# Patient Record
Sex: MALE | Race: WHITE | NOT HISPANIC OR LATINO | URBAN - METROPOLITAN AREA
[De-identification: names, ages, dates, MRNs, and addresses within clinical notes are randomized per-mention and may not be internally consistent; named-entity substitution may affect disease eponyms.]

---

## 2022-07-09 ENCOUNTER — TELEPHONE ENCOUNTER (OUTPATIENT)
Dept: URBAN - METROPOLITAN AREA CLINIC 121 | Facility: CLINIC | Age: 67
End: 2022-07-09

## 2022-07-09 RX ORDER — ZOLPIDEM TARTRATE 10 MG/1
TABLET, FILM COATED ORAL
Refills: 0 | OUTPATIENT
Start: 2016-01-05 | End: 2016-02-04

## 2022-07-09 RX ORDER — LACTOBACILLUS COMBINATION NO.4 3B CELL
CAPSULE ORAL ONCE A DAY
Refills: 0 | OUTPATIENT
Start: 2016-01-05 | End: 2016-02-04

## 2022-07-09 RX ORDER — ESOMEPRAZOLE MAGNESIUM 40 MG/1
GRANULE, DELAYED RELEASE ORAL ONCE A DAY
Refills: 0 | OUTPATIENT
Start: 2016-01-05 | End: 2016-02-04

## 2022-07-09 RX ORDER — TIOTROPIUM BROMIDE INHALATION SPRAY 3.12 UG/1
SPRAY, METERED RESPIRATORY (INHALATION)
Refills: 0 | OUTPATIENT
Start: 2016-01-05 | End: 2016-02-04

## 2022-07-09 RX ORDER — ZOLPIDEM TARTRATE 10 MG/1
TABLET, FILM COATED ORAL
Refills: 0 | OUTPATIENT
Start: 2016-02-04 | End: 2019-01-14

## 2022-07-09 RX ORDER — ESOMEPRAZOLE MAGNESIUM 40 MG/1
GRANULE, DELAYED RELEASE ORAL
Refills: 0 | OUTPATIENT
Start: 2014-10-13 | End: 2016-01-05

## 2022-07-09 RX ORDER — ALBUTEROL SULFATE 90 UG/1
AEROSOL, METERED RESPIRATORY (INHALATION)
Refills: 0 | OUTPATIENT
Start: 2016-01-05 | End: 2016-02-04

## 2022-07-09 RX ORDER — LOVASTATIN 40 MG/1
TABLET ORAL ONCE A DAY
Refills: 0 | OUTPATIENT
Start: 2016-01-05 | End: 2016-02-04

## 2022-07-09 RX ORDER — SENNOSIDES 8.6 MG
TABLET ORAL
Refills: 0 | OUTPATIENT
Start: 2014-11-20 | End: 2016-01-05

## 2022-07-09 RX ORDER — LACTOBACILLUS COMBINATION NO.4 3B CELL
CAPSULE ORAL
Refills: 0 | OUTPATIENT
Start: 2014-11-20 | End: 2016-01-05

## 2022-07-09 RX ORDER — LACTOBACILLUS COMBINATION NO.4 3B CELL
CAPSULE ORAL ONCE A DAY
Refills: 0 | OUTPATIENT
Start: 2016-02-04 | End: 2019-01-14

## 2022-07-09 RX ORDER — OXYCODONE AND ACETAMINOPHEN 5; 325 MG/1; MG/1
TABLET ORAL
Refills: 0 | OUTPATIENT
Start: 2014-10-13 | End: 2014-11-20

## 2022-07-09 RX ORDER — ESOMEPRAZOLE MAGNESIUM 40 MG/1
GRANULE, DELAYED RELEASE ORAL ONCE A DAY
Refills: 0 | OUTPATIENT
Start: 2016-02-04 | End: 2018-11-21

## 2022-07-10 ENCOUNTER — TELEPHONE ENCOUNTER (OUTPATIENT)
Dept: URBAN - METROPOLITAN AREA CLINIC 121 | Facility: CLINIC | Age: 67
End: 2022-07-10

## 2022-07-10 RX ORDER — LOVASTATIN 40 MG/1
TABLET ORAL ONCE A DAY
Refills: 0 | Status: ACTIVE | COMMUNITY
Start: 2016-02-04

## 2022-07-10 RX ORDER — FENOFIBRATE 160 MG/1
TABLET ORAL ONCE A DAY
Refills: 0 | Status: ACTIVE | COMMUNITY
Start: 2018-12-16

## 2022-07-10 RX ORDER — ATORVASTATIN CALCIUM 40 MG/1
TABLET, FILM COATED ORAL ONCE A DAY
Refills: 0 | Status: ACTIVE | COMMUNITY
Start: 2018-10-03

## 2022-07-10 RX ORDER — TAMSULOSIN HYDROCHLORIDE 0.4 MG/1
CAPSULE ORAL ONCE A DAY
Refills: 0 | Status: ACTIVE | COMMUNITY
Start: 2018-10-20

## 2022-07-10 RX ORDER — ESOMEPRAZOLE SODIUM 40 MG/5ML
INJECTION, POWDER, LYOPHILIZED, FOR SOLUTION INTRAVENOUS ONCE A DAY
Refills: 0 | Status: ACTIVE | COMMUNITY
Start: 2018-11-11

## 2022-07-10 RX ORDER — DICYCLOMINE HYDROCHLORIDE 10 MG/1
THREE TIMES A DAY CAPSULE ORAL THREE TIMES A DAY
Refills: 1 | Status: ACTIVE | COMMUNITY
Start: 2019-01-14

## 2022-07-10 RX ORDER — ZOLPIDEM TARTRATE 10 MG/1
TABLET, FILM COATED ORAL ONCE A DAY
Refills: 0 | Status: ACTIVE | COMMUNITY
Start: 2018-10-22

## 2022-07-10 RX ORDER — TIOTROPIUM BROMIDE INHALATION SPRAY 3.12 UG/1
SPRAY, METERED RESPIRATORY (INHALATION)
Refills: 0 | Status: ACTIVE | COMMUNITY
Start: 2016-02-04

## 2022-07-10 RX ORDER — ASPIRIN 81 MG/1
TABLET, FILM COATED ORAL ONCE A DAY
Refills: 0 | Status: ACTIVE | COMMUNITY
Start: 2019-01-14

## 2022-07-10 RX ORDER — TIOTROPIUM BROMIDE INHALATION SPRAY 3.12 UG/1
SPRAY, METERED RESPIRATORY (INHALATION)
Refills: 0 | Status: ACTIVE | COMMUNITY
Start: 2018-11-11

## 2022-07-10 RX ORDER — ALBUTEROL SULFATE 90 UG/1
AEROSOL, METERED RESPIRATORY (INHALATION)
Refills: 0 | Status: ACTIVE | COMMUNITY
Start: 2016-02-04

## 2022-07-30 ENCOUNTER — TELEPHONE ENCOUNTER (OUTPATIENT)
Age: 67
End: 2022-07-30

## 2022-07-30 RX ORDER — ESOMEPRAZOLE MAGNESIUM 40 MG/1
1 CAPSULE, DELAYED RELEASE ORAL DAILY
Qty: 0 | Refills: 2 | OUTPATIENT
Start: 2020-01-13 | End: 2020-01-13

## 2022-07-31 ENCOUNTER — TELEPHONE ENCOUNTER (OUTPATIENT)
Age: 67
End: 2022-07-31

## 2022-07-31 RX ORDER — WHEAT DEXTRIN 3 G/4 G
1 POWDER (GRAM) ORAL
Qty: 0 | Refills: 2 | Status: ACTIVE | COMMUNITY
Start: 2020-01-13

## 2022-07-31 RX ORDER — FAMOTIDINE 20 MG/1
1 TABLET ORAL
Qty: 0 | Refills: 16 | Status: ACTIVE | COMMUNITY
Start: 2020-01-13

## 2022-11-10 ENCOUNTER — WEB ENCOUNTER (OUTPATIENT)
Dept: URBAN - METROPOLITAN AREA CLINIC 9 | Facility: CLINIC | Age: 67
End: 2022-11-10

## 2022-11-10 ENCOUNTER — OFFICE VISIT (OUTPATIENT)
Dept: URBAN - METROPOLITAN AREA CLINIC 9 | Facility: CLINIC | Age: 67
End: 2022-11-10
Payer: COMMERCIAL

## 2022-11-10 VITALS
DIASTOLIC BLOOD PRESSURE: 70 MMHG | HEIGHT: 72 IN | BODY MASS INDEX: 22.89 KG/M2 | WEIGHT: 169 LBS | SYSTOLIC BLOOD PRESSURE: 118 MMHG

## 2022-11-10 DIAGNOSIS — K21.9 GASTROESOPHAGEAL REFLUX DISEASE WITHOUT ESOPHAGITIS: ICD-10-CM

## 2022-11-10 DIAGNOSIS — K58.1 IRRITABLE BOWEL SYNDROME WITH CONSTIPATION: ICD-10-CM

## 2022-11-10 DIAGNOSIS — K57.32 DVTRCLI OF LG INT W/O PERFORATION OR ABSCESS W/O BLEEDING: ICD-10-CM

## 2022-11-10 PROCEDURE — 99214 OFFICE O/P EST MOD 30 MIN: CPT | Performed by: INTERNAL MEDICINE

## 2022-11-10 PROCEDURE — 99244 OFF/OP CNSLTJ NEW/EST MOD 40: CPT | Performed by: INTERNAL MEDICINE

## 2022-11-10 RX ORDER — LORATADINE 10 MG
1 PACKET MIXED WITH 8 OUNCES OF FLUID TABLET,DISINTEGRATING ORAL ONCE A DAY
Qty: 30 | OUTPATIENT
Start: 2022-11-10 | End: 2022-12-10

## 2022-11-10 RX ORDER — LOVASTATIN 40 MG/1
TABLET ORAL ONCE A DAY
Refills: 0 | Status: ON HOLD | COMMUNITY
Start: 2016-02-04

## 2022-11-10 RX ORDER — ZOLPIDEM TARTRATE 10 MG/1
TABLET, FILM COATED ORAL ONCE A DAY
Refills: 0 | Status: ON HOLD | COMMUNITY
Start: 2018-10-22

## 2022-11-10 RX ORDER — FAMOTIDINE 20 MG/1
1 TABLET ORAL
Qty: 0 | Refills: 16 | Status: ON HOLD | COMMUNITY
Start: 2020-01-13

## 2022-11-10 RX ORDER — WHEAT DEXTRIN 3 G/4 G
1 POWDER (GRAM) ORAL
Qty: 0 | Refills: 2 | Status: ON HOLD | COMMUNITY
Start: 2020-01-13

## 2022-11-10 RX ORDER — ALBUTEROL SULFATE 90 UG/1
AEROSOL, METERED RESPIRATORY (INHALATION)
Refills: 0 | Status: ACTIVE | COMMUNITY
Start: 2016-02-04

## 2022-11-10 RX ORDER — ATORVASTATIN CALCIUM 40 MG/1
TABLET, FILM COATED ORAL ONCE A DAY
Refills: 0 | Status: ACTIVE | COMMUNITY
Start: 2018-10-03

## 2022-11-10 RX ORDER — ESOMEPRAZOLE MAGNESIUM 40 MG/1
1 CAPSULE CAPSULE, DELAYED RELEASE ORAL ONCE A DAY
Qty: 30 | OUTPATIENT
Start: 2022-11-10

## 2022-11-10 RX ORDER — LUBIPROSTONE 8 UG/1
CAPSULE, GELATIN COATED ORAL
Qty: 450 CAPSULE | Status: ACTIVE | COMMUNITY

## 2022-11-10 RX ORDER — ASPIRIN 81 MG/1
TABLET, FILM COATED ORAL ONCE A DAY
Refills: 0 | Status: ON HOLD | COMMUNITY
Start: 2019-01-14

## 2022-11-10 RX ORDER — CELECOXIB 200 MG/1
TAKE 1 CAPSULE BY MOUTH ONCE A DAY WITH FOOD CAPSULE ORAL
Qty: 90 EACH | Refills: 2 | Status: ACTIVE | COMMUNITY

## 2022-11-10 RX ORDER — TIOTROPIUM BROMIDE INHALATION SPRAY 3.12 UG/1
SPRAY, METERED RESPIRATORY (INHALATION)
Refills: 0 | Status: ON HOLD | COMMUNITY
Start: 2016-02-04

## 2022-11-10 RX ORDER — FENOFIBRATE 160 MG/1
TABLET ORAL ONCE A DAY
Refills: 0 | Status: ON HOLD | COMMUNITY
Start: 2018-12-16

## 2022-11-10 RX ORDER — LUBIPROSTONE 8 UG/1
CAPSULE, GELATIN COATED ORAL
OUTPATIENT

## 2022-11-10 RX ORDER — DICYCLOMINE HYDROCHLORIDE 10 MG/1
THREE TIMES A DAY CAPSULE ORAL THREE TIMES A DAY
Refills: 1 | Status: ON HOLD | COMMUNITY
Start: 2019-01-14

## 2022-11-10 RX ORDER — UMECLIDINIUM BROMIDE AND VILANTEROL TRIFENATATE 62.5; 25 UG/1; UG/1
INHALE 1 PUFF BY MOUTH EVERY DAY POWDER RESPIRATORY (INHALATION)
Qty: 180 EACH | Refills: 1 | Status: ACTIVE | COMMUNITY

## 2022-11-10 RX ORDER — ESOMEPRAZOLE SODIUM 40 MG/5ML
INJECTION, POWDER, LYOPHILIZED, FOR SOLUTION INTRAVENOUS ONCE A DAY
Refills: 0 | Status: ACTIVE | COMMUNITY
Start: 2018-11-11

## 2022-11-10 RX ORDER — LINACLOTIDE 72 UG/1
1 CAPSULE AT LEAST 30 MINUTES BEFORE THE FIRST MEAL OF THE DAY ON AN EMPTY STOMACH CAPSULE, GELATIN COATED ORAL ONCE A DAY
Qty: 30 | Refills: 2 | OUTPATIENT

## 2022-11-10 RX ORDER — TAMSULOSIN HYDROCHLORIDE 0.4 MG/1
CAPSULE ORAL ONCE A DAY
Refills: 0 | Status: ACTIVE | COMMUNITY
Start: 2018-10-20

## 2022-11-10 NOTE — HPI-TODAY'S VISIT:
Pt here for evaluation of chronic constipation. . Previously saw Dr. Bai . Pt had evaluation, labs and imaging in 2020 that were negative Pt has decades long issues with constipation . 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 Failed: Miralax Amitiza 24 mcg bid Amitiza 8 mcg bid Linzess but unsure of dose . Colon 2019 with Dr. Bai with small adenoma, repeat 5 years . Pt maintains on PPI for gerd sx. he has not had a recent EGD. I advised we cont PPi for now due to constipation but ultimately would prefer to avoid ppi if possible to h2 blocker. I advised we stop amitiza and use linzess but low dose. I advised we use linzess 72 with miralax, cont ppi, rtc 1 month and then decide next steps.  .

## 2022-11-16 ENCOUNTER — TELEPHONE ENCOUNTER (OUTPATIENT)
Dept: URBAN - METROPOLITAN AREA CLINIC 9 | Facility: CLINIC | Age: 67
End: 2022-11-16

## 2022-11-30 ENCOUNTER — OFFICE VISIT (OUTPATIENT)
Dept: URBAN - METROPOLITAN AREA CLINIC 9 | Facility: CLINIC | Age: 67
End: 2022-11-30
Payer: COMMERCIAL

## 2022-11-30 VITALS
BODY MASS INDEX: 22.35 KG/M2 | WEIGHT: 165 LBS | DIASTOLIC BLOOD PRESSURE: 80 MMHG | SYSTOLIC BLOOD PRESSURE: 128 MMHG | HEIGHT: 72 IN

## 2022-11-30 DIAGNOSIS — K21.9 GASTROESOPHAGEAL REFLUX DISEASE WITHOUT ESOPHAGITIS: ICD-10-CM

## 2022-11-30 DIAGNOSIS — K58.1 IRRITABLE BOWEL SYNDROME WITH CONSTIPATION: ICD-10-CM

## 2022-11-30 DIAGNOSIS — K57.32 DVTRCLI OF LG INT W/O PERFORATION OR ABSCESS W/O BLEEDING: ICD-10-CM

## 2022-11-30 PROBLEM — 111359004 DIVERTICULITIS OF COLON: Status: ACTIVE | Noted: 2022-11-10

## 2022-11-30 PROCEDURE — 99213 OFFICE O/P EST LOW 20 MIN: CPT | Performed by: INTERNAL MEDICINE

## 2022-11-30 RX ORDER — ZOLPIDEM TARTRATE 10 MG/1
TABLET, FILM COATED ORAL ONCE A DAY
Refills: 0 | Status: ACTIVE | COMMUNITY
Start: 2018-10-22

## 2022-11-30 RX ORDER — WHEAT DEXTRIN 3 G/4 G
1 POWDER (GRAM) ORAL
Qty: 0 | Refills: 2 | Status: ON HOLD | COMMUNITY
Start: 2020-01-13

## 2022-11-30 RX ORDER — ALBUTEROL SULFATE 90 UG/1
AEROSOL, METERED RESPIRATORY (INHALATION)
Refills: 0 | Status: ACTIVE | COMMUNITY
Start: 2016-02-04

## 2022-11-30 RX ORDER — LINACLOTIDE 72 UG/1
1 CAPSULE AT LEAST 30 MINUTES BEFORE THE FIRST MEAL OF THE DAY ON AN EMPTY STOMACH CAPSULE, GELATIN COATED ORAL ONCE A DAY
Qty: 30 | Refills: 2 | Status: ON HOLD | COMMUNITY

## 2022-11-30 RX ORDER — LORATADINE 10 MG
1 PACKET MIXED WITH 8 OUNCES OF FLUID TABLET,DISINTEGRATING ORAL ONCE A DAY
Qty: 30 | Status: ON HOLD | COMMUNITY
Start: 2022-11-10 | End: 2022-12-10

## 2022-11-30 RX ORDER — ESOMEPRAZOLE SODIUM 40 MG/5ML
INJECTION, POWDER, LYOPHILIZED, FOR SOLUTION INTRAVENOUS ONCE A DAY
Refills: 0 | Status: ACTIVE | COMMUNITY
Start: 2018-11-11

## 2022-11-30 RX ORDER — UMECLIDINIUM BROMIDE AND VILANTEROL TRIFENATATE 62.5; 25 UG/1; UG/1
INHALE 1 PUFF BY MOUTH EVERY DAY POWDER RESPIRATORY (INHALATION)
Qty: 180 EACH | Refills: 1 | Status: ACTIVE | COMMUNITY

## 2022-11-30 RX ORDER — ASPIRIN 81 MG/1
TABLET, FILM COATED ORAL ONCE A DAY
Refills: 0 | Status: ON HOLD | COMMUNITY
Start: 2019-01-14

## 2022-11-30 RX ORDER — TAMSULOSIN HYDROCHLORIDE 0.4 MG/1
CAPSULE ORAL ONCE A DAY
Refills: 0 | Status: ACTIVE | COMMUNITY
Start: 2018-10-20

## 2022-11-30 RX ORDER — FAMOTIDINE 20 MG/1
1 TABLET ORAL
Qty: 0 | Refills: 16 | Status: ON HOLD | COMMUNITY
Start: 2020-01-13

## 2022-11-30 RX ORDER — LOVASTATIN 40 MG/1
TABLET ORAL ONCE A DAY
Refills: 0 | Status: ON HOLD | COMMUNITY
Start: 2016-02-04

## 2022-11-30 RX ORDER — DICYCLOMINE HYDROCHLORIDE 10 MG/1
THREE TIMES A DAY CAPSULE ORAL THREE TIMES A DAY
Refills: 1 | Status: ON HOLD | COMMUNITY
Start: 2019-01-14

## 2022-11-30 RX ORDER — FENOFIBRATE 160 MG/1
TABLET ORAL ONCE A DAY
Refills: 0 | Status: ON HOLD | COMMUNITY
Start: 2018-12-16

## 2022-11-30 RX ORDER — TIOTROPIUM BROMIDE INHALATION SPRAY 3.12 UG/1
SPRAY, METERED RESPIRATORY (INHALATION)
Refills: 0 | Status: ON HOLD | COMMUNITY
Start: 2016-02-04

## 2022-11-30 RX ORDER — ESOMEPRAZOLE MAGNESIUM 40 MG/1
1 CAPSULE CAPSULE, DELAYED RELEASE ORAL ONCE A DAY
Qty: 30 | Status: ACTIVE | COMMUNITY
Start: 2022-11-10

## 2022-11-30 RX ORDER — CELECOXIB 200 MG/1
TAKE 1 CAPSULE BY MOUTH ONCE A DAY WITH FOOD CAPSULE ORAL
Qty: 90 EACH | Refills: 2 | Status: ACTIVE | COMMUNITY

## 2022-11-30 RX ORDER — ATORVASTATIN CALCIUM 40 MG/1
TABLET, FILM COATED ORAL ONCE A DAY
Refills: 0 | Status: ACTIVE | COMMUNITY
Start: 2018-10-03

## 2022-11-30 NOTE — HPI-TODAY'S VISIT:
Pt here for f/u of defecation issuews . Previously saw Dr. Bai . Pt had evaluation, labs and imaging in 2020 that were negative Pt has decades long issues with incomplete evacuation . 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 Failed: Metamucil Benefiber Miralax Amitiza 24 mcg bid Amitiza 8 mcg bid Linzess but unsure of dose . Colon 2019 with Dr. Bai with small adenoma, repeat 5 years . At this point he has ongoing sx more so with defecation. He can have 5 Bms per day with incomplete evacuation. I advised that we need pelvic floor eval with MR defecography and possible ARM, then possible pelvic floor therapy. he is agreeable. Will arrange and RTC in 4-6 weeks.  .

## 2023-01-04 ENCOUNTER — OFFICE VISIT (OUTPATIENT)
Dept: URBAN - METROPOLITAN AREA CLINIC 9 | Facility: CLINIC | Age: 68
End: 2023-01-04

## 2023-01-12 ENCOUNTER — TELEPHONE ENCOUNTER (OUTPATIENT)
Dept: URBAN - METROPOLITAN AREA CLINIC 9 | Facility: CLINIC | Age: 68
End: 2023-01-12

## 2023-01-12 ENCOUNTER — TELEPHONE ENCOUNTER (OUTPATIENT)
Dept: URBAN - METROPOLITAN AREA SURGERY CENTER 30 | Facility: SURGERY CENTER | Age: 68
End: 2023-01-12

## 2023-01-20 ENCOUNTER — OFFICE VISIT (OUTPATIENT)
Dept: URBAN - METROPOLITAN AREA CLINIC 9 | Facility: CLINIC | Age: 68
End: 2023-01-20
Payer: COMMERCIAL

## 2023-01-20 ENCOUNTER — DASHBOARD ENCOUNTERS (OUTPATIENT)
Age: 68
End: 2023-01-20

## 2023-01-20 VITALS
BODY MASS INDEX: 22.75 KG/M2 | DIASTOLIC BLOOD PRESSURE: 70 MMHG | WEIGHT: 168 LBS | HEIGHT: 72 IN | SYSTOLIC BLOOD PRESSURE: 128 MMHG

## 2023-01-20 DIAGNOSIS — K58.1 IRRITABLE BOWEL SYNDROME WITH CONSTIPATION: ICD-10-CM

## 2023-01-20 DIAGNOSIS — K21.9 GASTROESOPHAGEAL REFLUX DISEASE WITHOUT ESOPHAGITIS: ICD-10-CM

## 2023-01-20 DIAGNOSIS — K59.02 DYSSYNERGIC DEFECATION: ICD-10-CM

## 2023-01-20 PROCEDURE — 99213 OFFICE O/P EST LOW 20 MIN: CPT | Performed by: INTERNAL MEDICINE

## 2023-01-20 RX ORDER — TIOTROPIUM BROMIDE INHALATION SPRAY 3.12 UG/1
SPRAY, METERED RESPIRATORY (INHALATION)
Refills: 0 | Status: ON HOLD | COMMUNITY
Start: 2016-02-04

## 2023-01-20 RX ORDER — TAMSULOSIN HYDROCHLORIDE 0.4 MG/1
CAPSULE ORAL ONCE A DAY
Refills: 0 | Status: ACTIVE | COMMUNITY
Start: 2018-10-20

## 2023-01-20 RX ORDER — ASPIRIN 81 MG/1
TABLET, FILM COATED ORAL ONCE A DAY
Refills: 0 | Status: ON HOLD | COMMUNITY
Start: 2019-01-14

## 2023-01-20 RX ORDER — FAMOTIDINE 20 MG/1
1 TABLET ORAL
Qty: 0 | Refills: 16 | Status: ON HOLD | COMMUNITY
Start: 2020-01-13

## 2023-01-20 RX ORDER — ZOLPIDEM TARTRATE 10 MG/1
TABLET, FILM COATED ORAL ONCE A DAY
Refills: 0 | Status: ACTIVE | COMMUNITY
Start: 2018-10-22

## 2023-01-20 RX ORDER — WHEAT DEXTRIN 3 G/4 G
1 POWDER (GRAM) ORAL
Qty: 0 | Refills: 2 | Status: ON HOLD | COMMUNITY
Start: 2020-01-13

## 2023-01-20 RX ORDER — FENOFIBRATE 160 MG/1
TABLET ORAL ONCE A DAY
Refills: 0 | Status: ON HOLD | COMMUNITY
Start: 2018-12-16

## 2023-01-20 RX ORDER — LOVASTATIN 40 MG/1
TABLET ORAL ONCE A DAY
Refills: 0 | Status: ON HOLD | COMMUNITY
Start: 2016-02-04

## 2023-01-20 RX ORDER — ESOMEPRAZOLE SODIUM 40 MG/5ML
INJECTION, POWDER, LYOPHILIZED, FOR SOLUTION INTRAVENOUS ONCE A DAY
OUTPATIENT
Start: 2018-11-11

## 2023-01-20 RX ORDER — ESOMEPRAZOLE MAGNESIUM 40 MG/1
1 CAPSULE CAPSULE, DELAYED RELEASE ORAL ONCE A DAY
Qty: 30 | Status: ACTIVE | COMMUNITY
Start: 2022-11-10

## 2023-01-20 RX ORDER — ALBUTEROL SULFATE 90 UG/1
AEROSOL, METERED RESPIRATORY (INHALATION)
Refills: 0 | Status: ACTIVE | COMMUNITY
Start: 2016-02-04

## 2023-01-20 RX ORDER — DICYCLOMINE HYDROCHLORIDE 10 MG/1
THREE TIMES A DAY CAPSULE ORAL THREE TIMES A DAY
Refills: 1 | Status: ON HOLD | COMMUNITY
Start: 2019-01-14

## 2023-01-20 RX ORDER — ESOMEPRAZOLE SODIUM 40 MG/5ML
INJECTION, POWDER, LYOPHILIZED, FOR SOLUTION INTRAVENOUS ONCE A DAY
Refills: 0 | Status: ACTIVE | COMMUNITY
Start: 2018-11-11

## 2023-01-20 RX ORDER — UMECLIDINIUM BROMIDE AND VILANTEROL TRIFENATATE 62.5; 25 UG/1; UG/1
INHALE 1 PUFF BY MOUTH EVERY DAY POWDER RESPIRATORY (INHALATION)
Qty: 180 EACH | Refills: 1 | Status: ACTIVE | COMMUNITY

## 2023-01-20 RX ORDER — LINACLOTIDE 72 UG/1
1 CAPSULE AT LEAST 30 MINUTES BEFORE THE FIRST MEAL OF THE DAY ON AN EMPTY STOMACH CAPSULE, GELATIN COATED ORAL ONCE A DAY
Qty: 30 | Refills: 2 | Status: ON HOLD | COMMUNITY

## 2023-01-20 RX ORDER — CELECOXIB 200 MG/1
TAKE 1 CAPSULE BY MOUTH ONCE A DAY WITH FOOD CAPSULE ORAL
Qty: 90 EACH | Refills: 2 | Status: ACTIVE | COMMUNITY

## 2023-01-20 RX ORDER — ATORVASTATIN CALCIUM 40 MG/1
TABLET, FILM COATED ORAL ONCE A DAY
Refills: 0 | Status: ACTIVE | COMMUNITY
Start: 2018-10-03

## 2023-01-20 NOTE — HPI-TODAY'S VISIT:
Pt here for f/u of defecation issues . Previously saw Dr. Bai . Pt had evaluation, labs and imaging in 2020 that were negative Pt has decades long issues with incomplete evacuation . 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 Failed: Metamucil Benefiber Miralax Amitiza 24 mcg bid Amitiza 8 mcg bid Linzess but unsure of dose . Colon 2019 with Dr. Bai with small adenoma, repeat 5 years . 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 No rectal prolapse or rectocele.  I advised he needs pelvic floor PT which he has never done. He is agreeable.  . .

## 2023-01-24 ENCOUNTER — TELEPHONE ENCOUNTER (OUTPATIENT)
Dept: URBAN - METROPOLITAN AREA CLINIC 9 | Facility: CLINIC | Age: 68
End: 2023-01-24

## 2023-01-24 PROBLEM — 440630006: Status: ACTIVE | Noted: 2022-11-10

## 2023-01-24 PROBLEM — 179950008: Status: ACTIVE | Noted: 2023-01-20

## 2023-12-15 ENCOUNTER — TELEPHONE ENCOUNTER (OUTPATIENT)
Dept: URBAN - METROPOLITAN AREA CLINIC 9 | Facility: CLINIC | Age: 68
End: 2023-12-15